# Patient Record
Sex: MALE | Race: WHITE | NOT HISPANIC OR LATINO | Employment: UNEMPLOYED | URBAN - METROPOLITAN AREA
[De-identification: names, ages, dates, MRNs, and addresses within clinical notes are randomized per-mention and may not be internally consistent; named-entity substitution may affect disease eponyms.]

---

## 2019-08-09 ENCOUNTER — OFFICE VISIT (OUTPATIENT)
Dept: FAMILY MEDICINE | Facility: CLINIC | Age: 24
End: 2019-08-09
Payer: COMMERCIAL

## 2019-08-09 ENCOUNTER — ANCILLARY PROCEDURE (OUTPATIENT)
Dept: GENERAL RADIOLOGY | Facility: CLINIC | Age: 24
End: 2019-08-09
Attending: PHYSICIAN ASSISTANT
Payer: COMMERCIAL

## 2019-08-09 VITALS
HEIGHT: 70 IN | OXYGEN SATURATION: 99 % | SYSTOLIC BLOOD PRESSURE: 116 MMHG | BODY MASS INDEX: 20.19 KG/M2 | TEMPERATURE: 98.2 F | DIASTOLIC BLOOD PRESSURE: 74 MMHG | WEIGHT: 141 LBS | HEART RATE: 69 BPM | RESPIRATION RATE: 12 BRPM

## 2019-08-09 DIAGNOSIS — M79.672 LEFT FOOT PAIN: Primary | ICD-10-CM

## 2019-08-09 PROCEDURE — 73630 X-RAY EXAM OF FOOT: CPT | Mod: LT

## 2019-08-09 PROCEDURE — 99203 OFFICE O/P NEW LOW 30 MIN: CPT | Performed by: PHYSICIAN ASSISTANT

## 2019-08-09 ASSESSMENT — MIFFLIN-ST. JEOR: SCORE: 1630.82

## 2019-08-09 NOTE — PROGRESS NOTES
"Subjective     Aneesh Stringer is a 23 year old male who presents to clinic today for the following health issues:    HPI   Musculoskeletal problem/pain      Duration: started two weeks ago     Description  Location: left foot     Intensity:  moderate    Accompanying signs and symptoms: none     History  Previous similar problem: no   Previous evaluation:  none    Precipitating or alleviating factors:  Trauma or overuse: YES- snowboarding   Aggravating factors include: walking    Therapies tried and outcome: ice-no relief         BP Readings from Last 3 Encounters:   08/09/19 116/74   10/28/13 130/75   09/16/13 123/75    Wt Readings from Last 3 Encounters:   08/09/19 64 kg (141 lb)   10/28/13 66.5 kg (146 lb 8 oz) (35 %)*   09/16/13 66.2 kg (146 lb) (35 %)*     * Growth percentiles are based on CDC (Boys, 2-20 Years) data.                      Reviewed and updated as needed this visit by Provider         Review of Systems   ROS COMP: Constitutional, HEENT, cardiovascular, pulmonary, gi and gu systems are negative, except as otherwise noted.      Objective    /74 (BP Location: Left arm, Patient Position: Sitting, Cuff Size: Adult Regular)   Pulse 69   Temp 98.2  F (36.8  C) (Oral)   Resp 12   Ht 1.778 m (5' 10\")   Wt 64 kg (141 lb)   SpO2 99%   BMI 20.23 kg/m    Body mass index is 20.23 kg/m .  Physical Exam   GENERAL: alert and no distress  EYES: Eyes grossly normal to inspection  MS: no swelling or deformity over left foot.  Some mild tenderness over 1st MTP    Diagnostic Test Results:  Results for orders placed or performed in visit on 08/09/19 (from the past 24 hour(s))   XR Foot Left G/E 3 Views    Narrative    XR FOOT LT G/E 3 VW 8/9/2019 1:21 PM     HISTORY: Left foot pain      Impression    IMPRESSION: Negative exam.    HARDIK GUZMÁN MD           Assessment & Plan     1. Left foot pain  No sign of fracture.  Continue supportive care.  If symptoms persist or worsen, follow up with ortho  - XR " Foot Left G/E 3 Views  - ORTHOPEDICS ADULT REFERRAL           Return in about 1 year (around 8/9/2020).    Jose Garcia PA-C  [facility]

## 2019-08-09 NOTE — NURSING NOTE
"Chief Complaint   Patient presents with     Musculoskeletal Problem     /74 (BP Location: Left arm, Patient Position: Sitting, Cuff Size: Adult Regular)   Pulse 69   Temp 98.2  F (36.8  C) (Oral)   Resp 12   Ht 1.778 m (5' 10\")   Wt 64 kg (141 lb)   SpO2 99%   BMI 20.23 kg/m   Estimated body mass index is 20.23 kg/m  as calculated from the following:    Height as of this encounter: 1.778 m (5' 10\").    Weight as of this encounter: 64 kg (141 lb).  bp completed using cuff size: regular       Health Maintenance addressed:  NONE    n/a    Ivan Prescott MA     "

## 2021-12-15 ENCOUNTER — LAB REQUISITION (OUTPATIENT)
Dept: LAB | Facility: CLINIC | Age: 26
End: 2021-12-15

## 2021-12-15 DIAGNOSIS — R10.84 GENERALIZED ABDOMINAL PAIN: ICD-10-CM

## 2021-12-15 LAB
ERYTHROCYTE [DISTWIDTH] IN BLOOD BY AUTOMATED COUNT: 11.4 % (ref 10–15)
HCT VFR BLD AUTO: 46.3 % (ref 40–53)
HGB BLD-MCNC: 16.7 G/DL (ref 13.3–17.7)
MCH RBC QN AUTO: 32.8 PG (ref 26.5–33)
MCHC RBC AUTO-ENTMCNC: 36.1 G/DL (ref 31.5–36.5)
MCV RBC AUTO: 91 FL (ref 78–100)
PLATELET # BLD AUTO: 288 10E3/UL (ref 150–450)
RBC # BLD AUTO: 5.09 10E6/UL (ref 4.4–5.9)
WBC # BLD AUTO: 3.5 10E3/UL (ref 4–11)

## 2021-12-15 PROCEDURE — 85027 COMPLETE CBC AUTOMATED: CPT | Performed by: PHYSICIAN ASSISTANT

## 2021-12-15 PROCEDURE — 87209 SMEAR COMPLEX STAIN: CPT | Performed by: PHYSICIAN ASSISTANT

## 2021-12-15 PROCEDURE — 82947 ASSAY GLUCOSE BLOOD QUANT: CPT | Performed by: PHYSICIAN ASSISTANT

## 2021-12-16 LAB
ALBUMIN SERPL-MCNC: 4.3 G/DL (ref 3.5–5)
ALP SERPL-CCNC: 79 U/L (ref 45–120)
ALT SERPL W P-5'-P-CCNC: 14 U/L (ref 0–45)
ANION GAP SERPL CALCULATED.3IONS-SCNC: 14 MMOL/L (ref 5–18)
AST SERPL W P-5'-P-CCNC: 18 U/L (ref 0–40)
BILIRUB SERPL-MCNC: 2.3 MG/DL (ref 0–1)
BUN SERPL-MCNC: 9 MG/DL (ref 8–22)
CALCIUM SERPL-MCNC: 9.8 MG/DL (ref 8.5–10.5)
CHLORIDE BLD-SCNC: 106 MMOL/L (ref 98–107)
CO2 SERPL-SCNC: 22 MMOL/L (ref 22–31)
CREAT SERPL-MCNC: 0.71 MG/DL (ref 0.7–1.3)
GFR SERPL CREATININE-BSD FRML MDRD: >90 ML/MIN/1.73M2
GLUCOSE BLD-MCNC: 84 MG/DL (ref 70–125)
O+P STL MICRO: NEGATIVE
POTASSIUM BLD-SCNC: 4.7 MMOL/L (ref 3.5–5)
PROT SERPL-MCNC: 6.8 G/DL (ref 6–8)
SODIUM SERPL-SCNC: 142 MMOL/L (ref 136–145)

## 2024-01-24 ENCOUNTER — HOSPITAL ENCOUNTER (EMERGENCY)
Facility: HOSPITAL | Age: 29
Discharge: HOME | End: 2024-01-24
Attending: EMERGENCY MEDICINE | Admitting: EMERGENCY MEDICINE
Payer: COMMERCIAL

## 2024-01-24 ENCOUNTER — APPOINTMENT (OUTPATIENT)
Dept: ULTRASOUND IMAGING | Facility: HOSPITAL | Age: 29
End: 2024-01-24
Attending: PHYSICIAN ASSISTANT
Payer: COMMERCIAL

## 2024-01-24 ENCOUNTER — APPOINTMENT (OUTPATIENT)
Dept: CT IMAGING | Facility: HOSPITAL | Age: 29
End: 2024-01-24
Attending: PHYSICIAN ASSISTANT
Payer: COMMERCIAL

## 2024-01-24 VITALS
SYSTOLIC BLOOD PRESSURE: 121 MMHG | RESPIRATION RATE: 20 BRPM | DIASTOLIC BLOOD PRESSURE: 68 MMHG | OXYGEN SATURATION: 98 % | TEMPERATURE: 98.1 F | HEART RATE: 71 BPM

## 2024-01-24 DIAGNOSIS — E80.4 GILBERT SYNDROME: ICD-10-CM

## 2024-01-24 DIAGNOSIS — R74.8 ELEVATED LIPASE: ICD-10-CM

## 2024-01-24 DIAGNOSIS — R10.13 EPIGASTRIC PAIN: ICD-10-CM

## 2024-01-24 PROBLEM — M95.0 ACQUIRED NASAL DEFORMITY: Status: ACTIVE | Noted: 2023-03-21

## 2024-01-24 PROBLEM — J34.3 HYPERTROPHY OF NASAL TURBINATES: Status: ACTIVE | Noted: 2023-03-21

## 2024-01-24 PROBLEM — J34.2 DNS (DEVIATED NASAL SEPTUM): Status: ACTIVE | Noted: 2023-03-21

## 2024-01-24 LAB
ALBUMIN SERPL BCG-MCNC: 4.6 G/DL (ref 3.5–5.2)
ALBUMIN UR-MCNC: NEGATIVE MG/DL
ALP SERPL-CCNC: 82 U/L (ref 40–150)
ALT SERPL W P-5'-P-CCNC: 16 U/L (ref 0–70)
ANION GAP SERPL CALCULATED.3IONS-SCNC: 9 MMOL/L (ref 7–15)
APPEARANCE UR: CLEAR
AST SERPL W P-5'-P-CCNC: 17 U/L (ref 0–45)
BASOPHILS # BLD AUTO: 0.1 10E3/UL (ref 0–0.2)
BASOPHILS NFR BLD AUTO: 1 %
BILIRUB DIRECT SERPL-MCNC: <0.2 MG/DL (ref 0–0.3)
BILIRUB SERPL-MCNC: 3.8 MG/DL
BILIRUB UR QL STRIP: NEGATIVE
BUN SERPL-MCNC: 10.4 MG/DL (ref 6–20)
CALCIUM SERPL-MCNC: 9.5 MG/DL (ref 8.6–10)
CHLORIDE SERPL-SCNC: 104 MMOL/L (ref 98–107)
COLOR UR AUTO: NORMAL
CREAT SERPL-MCNC: 0.94 MG/DL (ref 0.67–1.17)
CRP SERPL-MCNC: <3 MG/L
DEPRECATED HCO3 PLAS-SCNC: 29 MMOL/L (ref 22–29)
EGFRCR SERPLBLD CKD-EPI 2021: >90 ML/MIN/1.73M2
EOSINOPHIL # BLD AUTO: 0.1 10E3/UL (ref 0–0.7)
EOSINOPHIL NFR BLD AUTO: 2 %
ERYTHROCYTE [DISTWIDTH] IN BLOOD BY AUTOMATED COUNT: 11.4 % (ref 10–15)
GLUCOSE SERPL-MCNC: 88 MG/DL (ref 70–99)
GLUCOSE UR STRIP-MCNC: NEGATIVE MG/DL
HCT VFR BLD AUTO: 46.2 % (ref 40–53)
HGB BLD-MCNC: 16.8 G/DL (ref 13.3–17.7)
HGB UR QL STRIP: NEGATIVE
HOLD SPECIMEN: NORMAL
HOLD SPECIMEN: NORMAL
IMM GRANULOCYTES # BLD: 0 10E3/UL
IMM GRANULOCYTES NFR BLD: 0 %
INR PPP: 1.08 (ref 0.85–1.15)
KETONES UR STRIP-MCNC: NEGATIVE MG/DL
LEUKOCYTE ESTERASE UR QL STRIP: NEGATIVE
LIPASE SERPL-CCNC: 231 U/L (ref 13–60)
LYMPHOCYTES # BLD AUTO: 1.4 10E3/UL (ref 0.8–5.3)
LYMPHOCYTES NFR BLD AUTO: 28 %
MCH RBC QN AUTO: 31.3 PG (ref 26.5–33)
MCHC RBC AUTO-ENTMCNC: 36.4 G/DL (ref 31.5–36.5)
MCV RBC AUTO: 86 FL (ref 78–100)
MONOCYTES # BLD AUTO: 0.5 10E3/UL (ref 0–1.3)
MONOCYTES NFR BLD AUTO: 9 %
NEUTROPHILS # BLD AUTO: 3 10E3/UL (ref 1.6–8.3)
NEUTROPHILS NFR BLD AUTO: 60 %
NITRATE UR QL: NEGATIVE
NRBC # BLD AUTO: 0 10E3/UL
NRBC BLD AUTO-RTO: 0 /100
PH UR STRIP: 6.5 [PH] (ref 5–7)
PLATELET # BLD AUTO: 276 10E3/UL (ref 150–450)
POTASSIUM SERPL-SCNC: 4.5 MMOL/L (ref 3.4–5.3)
PROT SERPL-MCNC: 6.9 G/DL (ref 6.4–8.3)
RBC # BLD AUTO: 5.36 10E6/UL (ref 4.4–5.9)
RBC URINE: 0 /HPF
SODIUM SERPL-SCNC: 142 MMOL/L (ref 135–145)
SP GR UR STRIP: 1.02 (ref 1–1.03)
UROBILINOGEN UR STRIP-MCNC: <2 MG/DL
WBC # BLD AUTO: 5.1 10E3/UL (ref 4–11)
WBC URINE: <1 /HPF

## 2024-01-24 PROCEDURE — 85025 COMPLETE CBC W/AUTO DIFF WBC: CPT | Performed by: EMERGENCY MEDICINE

## 2024-01-24 PROCEDURE — 81003 URINALYSIS AUTO W/O SCOPE: CPT | Performed by: STUDENT IN AN ORGANIZED HEALTH CARE EDUCATION/TRAINING PROGRAM

## 2024-01-24 PROCEDURE — 86140 C-REACTIVE PROTEIN: CPT | Performed by: PHYSICIAN ASSISTANT

## 2024-01-24 PROCEDURE — 85025 COMPLETE CBC W/AUTO DIFF WBC: CPT | Performed by: STUDENT IN AN ORGANIZED HEALTH CARE EDUCATION/TRAINING PROGRAM

## 2024-01-24 PROCEDURE — 74177 CT ABD & PELVIS W/CONTRAST: CPT

## 2024-01-24 PROCEDURE — 81001 URINALYSIS AUTO W/SCOPE: CPT | Performed by: EMERGENCY MEDICINE

## 2024-01-24 PROCEDURE — 250N000011 HC RX IP 250 OP 636: Performed by: PHYSICIAN ASSISTANT

## 2024-01-24 PROCEDURE — 36415 COLL VENOUS BLD VENIPUNCTURE: CPT | Performed by: STUDENT IN AN ORGANIZED HEALTH CARE EDUCATION/TRAINING PROGRAM

## 2024-01-24 PROCEDURE — 80053 COMPREHEN METABOLIC PANEL: CPT | Performed by: STUDENT IN AN ORGANIZED HEALTH CARE EDUCATION/TRAINING PROGRAM

## 2024-01-24 PROCEDURE — 85610 PROTHROMBIN TIME: CPT | Performed by: PHYSICIAN ASSISTANT

## 2024-01-24 PROCEDURE — 80053 COMPREHEN METABOLIC PANEL: CPT | Performed by: EMERGENCY MEDICINE

## 2024-01-24 PROCEDURE — 99285 EMERGENCY DEPT VISIT HI MDM: CPT | Mod: 25

## 2024-01-24 PROCEDURE — 76705 ECHO EXAM OF ABDOMEN: CPT

## 2024-01-24 PROCEDURE — 83690 ASSAY OF LIPASE: CPT | Performed by: PHYSICIAN ASSISTANT

## 2024-01-24 PROCEDURE — 82248 BILIRUBIN DIRECT: CPT | Performed by: PHYSICIAN ASSISTANT

## 2024-01-24 RX ORDER — SUCRALFATE 1 G/1
1 TABLET ORAL 4 TIMES DAILY
Qty: 30 TABLET | Refills: 0 | Status: SHIPPED | OUTPATIENT
Start: 2024-01-24

## 2024-01-24 RX ORDER — IOPAMIDOL 755 MG/ML
69 INJECTION, SOLUTION INTRAVASCULAR ONCE
Status: COMPLETED | OUTPATIENT
Start: 2024-01-24 | End: 2024-01-24

## 2024-01-24 RX ADMIN — IOPAMIDOL 69 ML: 755 INJECTION, SOLUTION INTRAVENOUS at 19:07

## 2024-01-24 ASSESSMENT — ACTIVITIES OF DAILY LIVING (ADL)
ADLS_ACUITY_SCORE: 35
ADLS_ACUITY_SCORE: 35

## 2024-01-24 NOTE — ED TRIAGE NOTES
Pt reports hx of UTI, was given abx, took them all, now having R sided flank pain and RUQ pain.  Pt was concerned about his temp check at home which was under 97 degrees.  Pt's temp in triage is WNL.       Triage Assessment (Adult)       Row Name 01/24/24 1553          Triage Assessment    Airway WDL WDL        Respiratory WDL    Respiratory WDL WDL        Skin Circulation/Temperature WDL    Skin Circulation/Temperature WDL WDL        Cardiac WDL    Cardiac WDL X;rhythm     Pulse Rate & Regularity tachycardic        Peripheral/Neurovascular WDL    Peripheral Neurovascular WDL WDL        Cognitive/Neuro/Behavioral WDL    Cognitive/Neuro/Behavioral WDL WDL

## 2024-01-24 NOTE — ED PROVIDER NOTES
Emergency Department Encounter   NAME: Aneesh Stringer ; AGE: 28 year old male ; YOB: 1995 ; MRN: [id number] ; PCP: Keith Cruz   ED PROVIDER: Azeb Flowers PA-C    Evaluation Date & Time:   No admission date for patient encounter.    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  The patient presents to the emergency department for evaluation of several episodes of epigastric abdominal pain radiating to his back.  He called the nurse triage line today, had taken his home oral temperature with low temperature readings, and was advised to visit the ER for further evaluation.  Here in the ED, he is well-appearing, temperature within normal range at 98.1 and he is vitally stable.  Nontoxic.  He is currently asymptomatic and is not experiencing the epigastric pain.  His abdominal exam is completely benign.  There is no reproducible tenderness, his abdomen is soft and there is no evidence of peritonitis.  He was recently treated with a 10-day course of Levaquin for UTI/epididymitis and his urinary symptoms and testicular pain has since resolved.  His STI testing per chart review was negative.  Considered a broad differential including gastritis, medication reaction, PUD, symptomatic cholelithiasis, acute cholecystitis, choledocholithiasis, pancreatitis, nephrolithiasis, pyelonephritis.  We discussed plan for CT and laboratory workup.  He declined anything for pain as he is asymptomatic at this time.  His labs were notable for an elevated total bilirubin of 3.8 with a normal direct bilirubin, and a lipase of 231.  CT returned unremarkable without any evidence of acute pancreatitis, pseudocyst, necrosis or abscess.  No nephrolithiasis, hydronephrosis or ureteral obstruction.  Given his  abnormal bilirubin and lipase, did obtain a right upper quadrant ultrasound which showed a normal gallbladder appearance, common bile duct within normal range at 3 mm.  No evidence of acute cholecystitis or choledocholithiasis.  After further discussion with patient, he has a history of Gilbert's syndrome which would account of his chronic elevation in his billirubin and may have acute flare with recent infection.  Lipase elevation is mild and no radiographic evidence of acute pancreatitis.  Suspect patient may have a component of gastritis given his recent 10-day antibiotic course and we discussed plan for omeprazole and Carafate for symptomatic treatment at home, and close recheck in his primary care clinic within the next 2 weeks to have lipase repeated.  We reviewed concerning signs and symptoms to return to the ER and he verbalized understanding.  Discharged home in stable condition.    Repeat UA today is completely clear without any signs of ongoing infection.  No leukocytosis or abnormal vitals to suggest sepsis.    Medical Decision Making    History:  Supplemental history from: Documented in chart  External Record(s) reviewed: Yes - ED visit on 1/12/2024; RN triage call today     Work Up:  Chart documentation includes differential considered and any EKGs or imaging independently interpreted by provider, where specified.  In additional to work up documented, I considered the following work up: Documented in chart, if applicable.    External consultation:  Discussion of management with another provider: Documented in chart, if applicable    Complicating factors:  Care impacted by chronic illness: Gilbert's disease   Care affected by social determinants of health: Access to Medical Care    Disposition considerations: Discharge. I prescribed additional prescription strength medication(s) as charted. See documentation for any additional details.      ED COURSE:  6:20 PM I met and introduced myself to the patient.  I gathered initial history and performed my physical exam. We discussed plan for initial workup.   8:00 PM I have staffed the patient with Dr. Antonio ED MD, who has evaluated the patient and agrees with all aspects of today's care.   8:03 PM I rechecked the patient and updated him on results.   8:04 PM I have staffed the patient with Dr. Antonio ED MD, who has evaluated the patient and agrees with all aspects of today's care.   9:09 PM I rechecked the patient and discussed results, discharge, follow up, and reasons to return to the ED.     At the conclusion of the encounter I discussed the results of all the tests and the disposition. The questions were answered. The patient or family acknowledged understanding and was agreeable with the care plan.    FINAL IMPRESSION:    ICD-10-CM    1. Epigastric pain  R10.13       2. Gilbert syndrome  E80.4       3. Elevated lipase  R74.8             MEDICATIONS GIVEN IN THE EMERGENCY DEPARTMENT:  Medications   iopamidol (ISOVUE-370) solution 69 mL (69 mLs Intravenous $Given 1/24/24 1907)         NEW PRESCRIPTIONS STARTED AT TODAY'S ED VISIT:  Discharge Medication List as of 1/24/2024  9:14 PM        START taking these medications    Details   sucralfate (CARAFATE) 1 GM tablet Take 1 tablet (1 g) by mouth 4 times daily, Disp-30 tablet, R-0, Local Print      omeprazole (PRILOSEC) 20 MG DR capsule Take 1 capsule (20 mg) by mouth daily for 30 days, Disp-30 capsule, R-0, Local Print               HPI   Patient information was obtained from: patient    Use of Intrepreter: N/A     Aneesh Stringer is a 28 year old male with a 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    The patient reports being at urgent care 2 weeks ago for dysuria. He was compliant with the 10 days of antibiotics and has recently stopped them 2-3 days ago. Today he has been experiencing RUQ abdominal  pain that radiates to his bilateral flanks. It is a dull pain that started since he stopped his antibiotics. He self-checked his oral temperature twice and got 95 F and 96 F. He contacted his PCP about this and was told to come here. Now his body temperature has been consistently 98 F. He was having dysuria and testicular pain, however, they have resolved since his last urgent care visit (1/12/2024). Of note, he has no history of kidney stones or abdominal surgery. Intake of ETOH occasionally.    He denies fever, chills, vomit, nausea, swelling, rash, and any other complaints at this time.       REVIEW OF SYSTEMS:  Pertinent positive and negative symptoms per HPI.       Medical History     No past medical history on file.    No past surgical history on file.    Family History   Problem Relation Age of Onset    Unknown/Adopted Mother        Social History     Tobacco Use    Smoking status: Never    Smokeless tobacco: Never   Substance Use Topics    Alcohol use: Yes    Drug use: No       omeprazole (PRILOSEC) 20 MG DR capsule  sucralfate (CARAFATE) 1 GM tablet          Physical Exam     First Vitals:  Patient Vitals for the past 24 hrs:   BP Temp Temp src Pulse Resp SpO2   01/24/24 2105 121/68 -- -- 71 -- 98 %   01/24/24 2035 135/86 -- -- 64 -- 96 %   01/24/24 1840 123/79 -- -- 63 -- 99 %   01/24/24 1825 120/81 -- -- 69 -- 99 %   01/24/24 1810 113/68 -- -- 74 -- 100 %   01/24/24 1807 -- 98.1  F (36.7  C) -- -- -- --   01/24/24 1805 135/82 -- -- 72 -- 99 %   01/24/24 1552 (!) 159/96 98  F (36.7  C) Temporal 102 20 98 %         PHYSICAL EXAM:   Physical Exam  Vitals and nursing note reviewed.   Constitutional:       General: He is not in acute distress.     Appearance: Normal appearance. He is not ill-appearing, toxic-appearing or diaphoretic.   Cardiovascular:      Rate and Rhythm: Normal rate and regular rhythm.      Heart sounds: Normal heart sounds.   Pulmonary:      Effort: Pulmonary effort is normal.      Breath  sounds: Normal breath sounds.   Abdominal:      General: Abdomen is flat. Bowel sounds are normal. There is no distension.      Palpations: Abdomen is soft.      Tenderness: There is no abdominal tenderness. There is no right CVA tenderness, left CVA tenderness, guarding or rebound.   Neurological:      Mental Status: He is alert.             Results     LAB:  All pertinent labs reviewed and interpreted  Labs Ordered and Resulted from Time of ED Arrival to Time of ED Departure   COMPREHENSIVE METABOLIC PANEL - Abnormal       Result Value    Sodium 142      Potassium 4.5      Carbon Dioxide (CO2) 29      Anion Gap 9      Urea Nitrogen 10.4      Creatinine 0.94      GFR Estimate >90      Calcium 9.5      Chloride 104      Glucose 88      Alkaline Phosphatase 82      AST 17      ALT 16      Protein Total 6.9      Albumin 4.6      Bilirubin Total 3.8 (*)    LIPASE - Abnormal    Lipase 231 (*)    ROUTINE UA WITH MICROSCOPIC REFLEX TO CULTURE - Normal    Color Urine Light Yellow      Appearance Urine Clear      Glucose Urine Negative      Bilirubin Urine Negative      Ketones Urine Negative      Specific Gravity Urine 1.018      Blood Urine Negative      pH Urine 6.5      Protein Albumin Urine Negative      Urobilinogen Urine <2.0      Nitrite Urine Negative      Leukocyte Esterase Urine Negative      RBC Urine 0      WBC Urine <1     CRP INFLAMMATION - Normal    CRP Inflammation <3.00     INR - Normal    INR 1.08     BILIRUBIN DIRECT - Normal    Bilirubin Direct <0.20     CBC WITH PLATELETS AND DIFFERENTIAL    WBC Count 5.1      RBC Count 5.36      Hemoglobin 16.8      Hematocrit 46.2      MCV 86      MCH 31.3      MCHC 36.4      RDW 11.4      Platelet Count 276      % Neutrophils 60      % Lymphocytes 28      % Monocytes 9      % Eosinophils 2      % Basophils 1      % Immature Granulocytes 0      NRBCs per 100 WBC 0      Absolute Neutrophils 3.0      Absolute Lymphocytes 1.4      Absolute Monocytes 0.5      Absolute  Eosinophils 0.1      Absolute Basophils 0.1      Absolute Immature Granulocytes 0.0      Absolute NRBCs 0.0         RADIOLOGY:  US Abdomen Limited   Final Result   IMPRESSION:   1.  Normal limited abdominal ultrasound.            CT Abdomen Pelvis w Contrast   Final Result   IMPRESSION:    1.  No acute findings in the abdomen and pelvis. No stones are identified. Appendix normal.            ECG:  None      PROCEDURES:  None      Margaret NDIAYE, am serving as a scribe to document services personally performed by Azeb Flowers PA-C, based on my observation and the provider's statements to me. I, Azeb Flowers PA-C attest that Margaret Barnhart is acting in a scribe capacity, has observed my performance of the services and has documented them in accordance with my direction.       Azeb Flowers PA-C   Emergency Medicine   [facility] EMERGENCY DEPARTMENT       Azeb Flowers PA-C  01/24/24 7298

## 2024-01-25 NOTE — DISCHARGE INSTRUCTIONS
As we discussed, your imaging today appeared normal.  I suspect your bilirubin is high due to your Gilbert syndrome.  Please have your lipase rechecked in your primary care clinic within the next several weeks.  We will start you on the antiacid medicine omeprazole to see if this improves your symptoms.  Please avoid any spicy or acidic foods, caffeine, coffee, and alcohol until pain improves.  If it anytime you develop fever, worsening pain, black or bloody stools, nausea or vomiting please return to the ER for further evaluation.

## 2024-01-25 NOTE — ED PROVIDER NOTES
Emergency Department Midlevel Supervisory Note     I had a face to face encounter with this patient seen by the Advanced Practice Provider (DANAY). I personally made/approved the management plan and take responsibility for the patient management. I personally saw patient and performed a substantive portion of the visit including all aspects of the medical decision making.     ED Course:  8:04 PM Ca Flowers PA-C staffed patient with me. I agree with their assessment and plan of management, and I will see the patient.  8:28 PM I met with the patient to introduce myself, gather additional history, perform my initial exam, and discuss the plan.     Brief HPI:     Aneesh Stringer is a 28 year old male who presents for evaluation of abdominal pain and flank pain.    Patient reports today about half way through taking his antibiotics (levofloxacin) for his epididymitis, he started to develop intermittent RUQ that radiates into his bilateral flanks. He thought it was related to a UTI but then it returned today with intense severity so he went to the ED. He states that he's been finished with the antibiotics for about 2-3 days now.He denies associating diarrhea, nausea, and vomiting. He doesn't taken any routine medications. There were no other concerns/complaints at this time.     Shx: He is a social alcohol user (only 1 every 2 weeks).   Fhx: He denies family history of gallbladder problems.    I, Babita Peck, am serving as a scribe to document services personally performed by Michelle Alvarez MD, based on my observations and the provider's statements to me.   I, Michelle Alvarez MD, attest that Babita Peck was acting in a scribe capacity, has observed my performance of the services and has documented them in accordance with my direction.    Brief Physical Exam: /68   Pulse 71   Temp 98.1  F (36.7  C)   Resp 20   SpO2 98%   Constitutional:  Alert, in no acute distress  EYES: Conjunctivae clear  HENT:   Atraumatic  Respiratory:  Respirations even, unlabored, in no acute respiratory distress  Cardiovascular:  Regular rate and rhythm, good peripheral perfusion  GI: Soft, non-distended, non-tender  Musculoskeletal:  Moves all 4 extremities equally, grossly symmetrical strength  Integument: Warm & dry. No appreciable rash, erythema.  Neurologic:  Alert & oriented, speech clear and fluent, no focal deficits noted  Psych: Normal mood and affect       MDM:  His abdominal pain long term through his course of antibiotics suggest that this is gastritis.  He has not had any black or blood in his stools or diarrhea to suggest C. difficile.  On his examination today his initial lipase is elevated and CT scan is unremarkable in the area so there is no sign of significant pancreatic inflammation.  The lipase elevation is mild.  He does not have any severe nausea or vomiting with this but does have some mild discomfort.  So, we are going to do ultrasound to evaluate for any signs of choledocholithiasis.  If there is no widening there, then I think we can discharge home with treatment for gastritis that may be irritating the pancreas.  Will do PPI and Carafate.  His bilirubin is likely elevated because of recent medications and infection in the setting of Gilbert.    His ultrasound images and reading as well as CT results are reviewed.  Ultrasound is unremarkable for any signs of enlarged common bile duct or cholecystitis/stone so patient will be discharged home with PPI and Carafate.       1. Epigastric pain    2. Gilbert syndrome    3. Elevated lipase            Labs and Imaging:  Results for orders placed or performed during the hospital encounter of 01/24/24   CT Abdomen Pelvis w Contrast    Impression    IMPRESSION:   1.  No acute findings in the abdomen and pelvis. No stones are identified. Appendix normal.   US Abdomen Limited    Impression    IMPRESSION:  1.  Normal limited abdominal ultrasound.       Comprehensive metabolic  panel   Result Value Ref Range    Sodium 142 135 - 145 mmol/L    Potassium 4.5 3.4 - 5.3 mmol/L    Carbon Dioxide (CO2) 29 22 - 29 mmol/L    Anion Gap 9 7 - 15 mmol/L    Urea Nitrogen 10.4 6.0 - 20.0 mg/dL    Creatinine 0.94 0.67 - 1.17 mg/dL    GFR Estimate >90 >60 mL/min/1.73m2    Calcium 9.5 8.6 - 10.0 mg/dL    Chloride 104 98 - 107 mmol/L    Glucose 88 70 - 99 mg/dL    Alkaline Phosphatase 82 40 - 150 U/L    AST 17 0 - 45 U/L    ALT 16 0 - 70 U/L    Protein Total 6.9 6.4 - 8.3 g/dL    Albumin 4.6 3.5 - 5.2 g/dL    Bilirubin Total 3.8 (H) <=1.2 mg/dL   UA with Microscopic reflex to Culture    Specimen: Urine, Midstream   Result Value Ref Range    Color Urine Light Yellow Colorless, Straw, Light Yellow, Yellow    Appearance Urine Clear Clear    Glucose Urine Negative Negative mg/dL    Bilirubin Urine Negative Negative    Ketones Urine Negative Negative mg/dL    Specific Gravity Urine 1.018 1.001 - 1.030    Blood Urine Negative Negative    pH Urine 6.5 5.0 - 7.0    Protein Albumin Urine Negative Negative mg/dL    Urobilinogen Urine <2.0 <2.0 mg/dL    Nitrite Urine Negative Negative    Leukocyte Esterase Urine Negative Negative    RBC Urine 0 <=2 /HPF    WBC Urine <1 <=5 /HPF   CBC with platelets and differential   Result Value Ref Range    WBC Count 5.1 4.0 - 11.0 10e3/uL    RBC Count 5.36 4.40 - 5.90 10e6/uL    Hemoglobin 16.8 13.3 - 17.7 g/dL    Hematocrit 46.2 40.0 - 53.0 %    MCV 86 78 - 100 fL    MCH 31.3 26.5 - 33.0 pg    MCHC 36.4 31.5 - 36.5 g/dL    RDW 11.4 10.0 - 15.0 %    Platelet Count 276 150 - 450 10e3/uL    % Neutrophils 60 %    % Lymphocytes 28 %    % Monocytes 9 %    % Eosinophils 2 %    % Basophils 1 %    % Immature Granulocytes 0 %    NRBCs per 100 WBC 0 <1 /100    Absolute Neutrophils 3.0 1.6 - 8.3 10e3/uL    Absolute Lymphocytes 1.4 0.8 - 5.3 10e3/uL    Absolute Monocytes 0.5 0.0 - 1.3 10e3/uL    Absolute Eosinophils 0.1 0.0 - 0.7 10e3/uL    Absolute Basophils 0.1 0.0 - 0.2 10e3/uL    Absolute  Immature Granulocytes 0.0 <=0.4 10e3/uL    Absolute NRBCs 0.0 10e3/uL   Extra Blue Top Tube   Result Value Ref Range    Hold Specimen JIC    Extra Red Top Tube   Result Value Ref Range    Hold Specimen JIC    Result Value Ref Range    Lipase 231 (H) 13 - 60 U/L   Result Value Ref Range    CRP Inflammation <3.00 <5.00 mg/L   Result Value Ref Range    INR 1.08 0.85 - 1.15   Result Value Ref Range    Bilirubin Direct <0.20 0.00 - 0.30 mg/dL       I have reviewed the relevant laboratory studies above.    I independently interpreted the following imaging study(s):   See mdm    EKG: I reviewed and independently interpreted the patient's EKG, with comments made as listed below. Please see scanned EKG for full report.   None    Procedures:  I was present for the key portions of procedures documented in DANAY/midlevel note, see midlevel note for further details.    Michelle Alvarez MD  [facility] EMERGENCY DEPARTMENT  [address] [place] [id number] [phone]     Michelle Alvarez MD  01/24/24 9233